# Patient Record
Sex: MALE | Race: WHITE | NOT HISPANIC OR LATINO | Employment: OTHER | URBAN - NONMETROPOLITAN AREA
[De-identification: names, ages, dates, MRNs, and addresses within clinical notes are randomized per-mention and may not be internally consistent; named-entity substitution may affect disease eponyms.]

---

## 2022-09-30 ENCOUNTER — TELEPHONE (OUTPATIENT)
Dept: OTOLARYNGOLOGY | Facility: CLINIC | Age: 77
End: 2022-09-30

## 2022-09-30 NOTE — TELEPHONE ENCOUNTER
Spoke with Medical records to have patient's chart faxed.    HUB please advise if they fax within the next 20 minutes as they are having issues with their fax.

## 2022-10-03 ENCOUNTER — OFFICE VISIT (OUTPATIENT)
Dept: OTOLARYNGOLOGY | Facility: CLINIC | Age: 77
End: 2022-10-03

## 2022-10-03 VITALS
BODY MASS INDEX: 35 KG/M2 | TEMPERATURE: 98 F | SYSTOLIC BLOOD PRESSURE: 125 MMHG | DIASTOLIC BLOOD PRESSURE: 64 MMHG | HEIGHT: 67 IN | WEIGHT: 223 LBS | RESPIRATION RATE: 20 BRPM | HEART RATE: 85 BPM

## 2022-10-03 DIAGNOSIS — Z79.01 ANTICOAGULATED: ICD-10-CM

## 2022-10-03 DIAGNOSIS — J31.0 RHINITIS, CHRONIC: ICD-10-CM

## 2022-10-03 DIAGNOSIS — R04.0 RIGHT-SIDED EPISTAXIS: Primary | ICD-10-CM

## 2022-10-03 DIAGNOSIS — J34.2 ACQUIRED DEVIATED NASAL SEPTUM: ICD-10-CM

## 2022-10-03 PROCEDURE — 99203 OFFICE O/P NEW LOW 30 MIN: CPT | Performed by: OTOLARYNGOLOGY

## 2022-10-03 PROCEDURE — 31238 NSL/SINS NDSC SRG NSL HEMRRG: CPT | Performed by: OTOLARYNGOLOGY

## 2022-10-03 RX ORDER — WARFARIN SODIUM 1 MG/1
TABLET ORAL
COMMUNITY

## 2022-10-03 RX ORDER — DIGOXIN 125 MCG
TABLET ORAL
COMMUNITY

## 2022-10-03 RX ORDER — SENNOSIDES 8.6 MG
CAPSULE ORAL EVERY 12 HOURS SCHEDULED
COMMUNITY

## 2022-10-03 RX ORDER — CEPHALEXIN 500 MG/1
CAPSULE ORAL
COMMUNITY

## 2022-10-03 RX ORDER — ATORVASTATIN CALCIUM 10 MG/1
TABLET, FILM COATED ORAL
COMMUNITY
Start: 2022-08-05

## 2022-10-03 RX ORDER — ASPIRIN 81 MG/1
TABLET ORAL
COMMUNITY

## 2022-10-03 RX ORDER — WARFARIN SODIUM 2.5 MG/1
TABLET ORAL
COMMUNITY

## 2022-10-03 RX ORDER — TRAMADOL HYDROCHLORIDE 50 MG/1
TABLET ORAL
COMMUNITY
Start: 2022-09-30

## 2022-10-03 RX ORDER — OXYMETAZOLINE HYDROCHLORIDE 0.05 G/100ML
2 SPRAY NASAL 3 TIMES DAILY
Qty: 2 ML | Refills: 0 | Status: SHIPPED | OUTPATIENT
Start: 2022-10-03 | End: 2022-10-06

## 2022-10-03 RX ORDER — LISINOPRIL AND HYDROCHLOROTHIAZIDE 25; 20 MG/1; MG/1
TABLET ORAL
COMMUNITY

## 2022-10-03 RX ORDER — MAGNESIUM OXIDE 400 MG/1
TABLET ORAL
COMMUNITY

## 2022-10-03 NOTE — PROGRESS NOTES
Riccardo Alatorre Jr, MD  [facility] [place] [facility] EAR NOSE & THROAT  [address], [address]  [facility] [address]  Fax [phone]  Phone [phone]      Visit Type: NEW PATIENT   Chief Complaint   Patient presents with   • Nose Bleed     Nose bleeds        HPI   Accompanied by: No one  He complains of nosebleed.   He had R sided nosebleed. Began Tues after cleaning scab out. Has Rhinorocket placed Tues. Had removed Thurs for R side. Bled badly. Had Rhinorocket replaced Sat night.  He has been sent here for this.  He has not bled over the weekend.   Smoke- none  Drink- none  Blood thinner- Coumadin. Stopped Wed.  He has artificial heart valve.      History reviewed. No pertinent past medical history.    History reviewed. No pertinent surgical history.    Family History: His family history is not on file.     Social History: He  reports that he has never smoked. He has never used smokeless tobacco. No history on file for alcohol use and drug use.    Home Medications:  Magnesium Chloride, acetaminophen, aspirin, atorvastatin, cephalexin, digoxin, lisinopril-hydrochlorothiazide, magnesium oxide, oxymetazoline, traMADol, and warfarin    Allergies:  He is allergic to clindamycin, dexlansoprazole, oxycodone-acetaminophen, and toprol xl [metoprolol].       Vital Signs:   Temp:  [98 °F (36.7 °C)] 98 °F (36.7 °C)  Heart Rate:  [85] 85  Resp:  [20] 20  BP: (125)/(64) 125/64  ENT Physical Exam  Constitutional  Appearance: patient appears well-developed and well-nourished,  Communication/Voice: communication appropriate for developmental age; vocal quality normal;  Constitutional comments: Obese  Elderly  Head and Face  Appearance: head appears normal, face appears normal and face appears atraumatic;  Palpation: facial palpation normal;  Salivary: glands normal;  Ear  Hearing: intact;  Auricles: bilateral auricles normal;  External Mastoids: right external mastoid normal; left  external mastoid normal;  Ear Canals: bilateral ear canals normal;  Tympanic Membranes: bilateral tympanic membranes normal;  Nose  External Nose: nares patent bilaterally; external nose normal;  Internal Nose: bilateral intranasal mucosa edematous and erythematous; Nasal mucosa comments: R pack removed, mild bleeding   nasal septal deviation present; deviation is to the right and to the left, septal deviation is intermediate; right inferior turbinate edematous; with crusting; left inferior turbinate edematous;  Oral Cavity/Oropharynx  Lips: normal;  Teeth: dentures noted;  Gums: gingiva normal;  Tongue: normal;  Oral mucosa: normal;  Hard palate: normal;  Soft palate: normal;  Tonsils: normal;  Base of Tongue: normal;  Posterior pharyngeal wall: normal;  Neck  Neck: neck normal;  Respiratory  Inspection: breathing unlabored; normal breathing rate;  Cardiovascular  Inspection: extremities are warm and well perfused; no peripheral edema present;  Neurovestibular  Mental Status: alert and oriented;  Psychiatric: mood normal; affect is appropriate;     Nasal endoscopy with left nasal debridement    Date/Time: 10/3/2022 2:59 PM  Performed by: Riccardo Alatorre Jr., MD  Authorized by: Riccardo Alatorre Jr., MD     Consent:     Consent obtained:  Verbal    Consent given by:  Patient    Alternatives discussed:  No treatment  Anesthesia (see MAR for exact dosages):     Anesthesia method:  Topical application    Topical anesthetic:  Tetracaine  Procedure details:     Indications: sino-nasal symptoms      Medication:  Afrin    The nose was examined and debrided using a rigid nasal endoscopy and suction      Debridement location:  Left nasal debridement  Nasal cavity:     right nasal cavity occluded (with short rhinorocket)      Right inferior turbinates: edema and crusting      Left inferior turbinates: edema and enlarged    Septum:    Findings: bleeding and right septal crusting      Deviation: deviated to the right  and deviated to the left      Deviation comment:  R to L mid moderate; L to R low moderate    Prominent septal vessels: right      Severity of septal vessels: intermediate    Sinus/ Nasopharynx:     Right middle meatus: patent and inflammation      Left middle meatus: patent and inflammation      Right nasopharynx: patent and inflammation      Left nasopharynx: patent and inflammation      Right eustachian tube: patent and inflammation      Left eustachian tube: patent and inflammation    Post-procedure details:     Patient tolerance of procedure:  Tolerated well  Comments:      Right Rhino Rocket removed, with some mild oozing from the septum both Kiesselbach's plexus and further posterior cross from the middle turbinate head.  There appears to be excoriation along this area.  The nose was anesthetized, and packed with Afrin tetracaine packs.  Silver nitrate was used to cauterize the right septum.  Cotton with mupirocin was packed above the right inferior turbinate along the site of bleeding on the septum.       Result Review    RESULTS REVIEW    I have reviewed the patients old records in the chart.     Assessment & Plan    Diagnoses and all orders for this visit:    1. Right-sided epistaxis (Primary)  Comments:  Multiple bleeding sites along septum after packing removal  Orders:  -     Nasal endoscopy with left nasal debridement    2. Anticoagulated  Comments:  Coumadin  Orders:  -     Nasal endoscopy with left nasal debridement    3. Acquired deviated nasal septum  Comments:  Left to right low and mid moderate to severe  Orders:  -     Nasal endoscopy with left nasal debridement    4. Rhinitis, chronic  -     Nasal endoscopy with left nasal debridement    Other orders  -     oxymetazoline (AFRIN) 0.05 % nasal spray; 2 sprays into the nostril(s) as directed by provider 3 (Three) Times a Day for 3 days. Use for 3 days then stop  Dispense: 2 mL; Refill: 0       Medical and surgical options were discussed including  observation, continued medical management, medication modification and surgical management. Risks, benefits and alternatives were discussed and questions were answered. After considering the options, the patient decided to proceed with observation and medication modification.     Patient has residual bleeding of the right nasal passage after removal of the pack.  The patient is currently off Coumadin.  I have cauterized the right nasal septum.  I will place a cotton pack for approximately 2 more days.  I have instructed the patient how to manage the pack.  He will continue Afrin 3 times daily x3 days, nasal saline frequently.  If the patient bleeds, he is to call the office.  Afrin x3 days  Nasal saline  Call for any bleeding  Leave nasal packing in position  Hold Coumadin until further notice      My Chart:  Encouraged to enroll in My Chart  Encouraged to review data and findings in My Chart    Patient understand(s) and agree(s) with the treatment plan as described.    Return in about 2 days (around 10/5/2022) for Recheck nose.      Riccardo Alatorre Jr, MD  10/03/22  15:17 CDT

## 2022-10-03 NOTE — PATIENT INSTRUCTIONS
NASAL SALINE:  Use 2 puffs each nostril 4-6 times daily and more frequently if possible.  You can buy saline spray or you can make your own and use an old spray bottle to administer  Use a humidifier at bedside  Recipe for saline:  Water                                 1 quart  Salt (table)                        1 tablespoon  Gylcerin (or Lona Syrup)    1 teaspoon  Sodium bicarbonate           1 teaspoon  Sprays or [place] pots are recommended    Do not allow to stand for more than 24 hrs. Make new solution. There is no preservative in this solution.    Afrin use:  Use 2 puffs each nostril 3 times daily for 3 days only!!  Stop using after 3 days unless instructed to use longer        Leave nasal pack in position    Continue Aspirin    INSTRUCTIONS FOR NOSE BLOWING:  DO NOT BLOW nose week     Do not take Warfarin for now    CONTACT INFORMATION:  The main office phone number is [phone]. For emergencies after hours and on weekends, this number will convert over to our answering service and the on call provider will answer. Please try to keep non emergent phone calls/ questions to office hours 9am-5pm Monday through Friday.     [employer]  As an alternative, you can sign up and use the Epic MyChart system for more direct and quicker access for non emergent questions/ problems.  [facility] [employer] allows you to send messages to your doctor, view your test results, renew your prescriptions, schedule appointments, and more. To sign up, go to [employer] and click on the Sign Up Now link in the New User? box. Enter your [employer] Activation Code exactly as it appears below along with the last four digits of your Social Security Number and your Date of Birth () to complete the sign-up process. If you do not sign up before the expiration date, you must request a new code.    [employer] Activation Code: [email or web address]  Expires: 2022  1:11 PM    If you have questions, you can email  [email or web address] or call [phone] to talk to our MyChart staff. Remember, [email or web address] is NOT to be used for urgent needs. For medical emergencies, dial 911.

## 2022-10-05 ENCOUNTER — OFFICE VISIT (OUTPATIENT)
Dept: OTOLARYNGOLOGY | Facility: CLINIC | Age: 77
End: 2022-10-05

## 2022-10-05 VITALS — HEIGHT: 67 IN | BODY MASS INDEX: 35 KG/M2 | WEIGHT: 223 LBS

## 2022-10-05 DIAGNOSIS — Z79.01 ANTICOAGULATED: ICD-10-CM

## 2022-10-05 DIAGNOSIS — R04.0 RIGHT-SIDED EPISTAXIS: Primary | ICD-10-CM

## 2022-10-05 DIAGNOSIS — J31.0 RHINITIS, CHRONIC: ICD-10-CM

## 2022-10-05 DIAGNOSIS — J34.2 ACQUIRED DEVIATED NASAL SEPTUM: ICD-10-CM

## 2022-10-05 PROCEDURE — 31231 NASAL ENDOSCOPY DX: CPT | Performed by: OTOLARYNGOLOGY

## 2022-10-05 NOTE — PATIENT INSTRUCTIONS
NASAL SALINE:  Use 2 puffs each nostril 4-6 times daily and more frequently if possible.  You can buy saline spray or you can make your own and use an old spray bottle to administer  Use a humidifier at bedside  Recipe for saline:  Water                                 1 quart  Salt (table)                        1 tablespoon  Gylcerin (or Lona Syrup)    1 teaspoon  Sodium bicarbonate           1 teaspoon  Sprays or [place] pots are recommended    Do not allow to stand for more than 24 hrs. Make new solution. There is no preservative in this solution.     Afrin use:  Use 2 puffs each nostril 3 times daily for 3 days only!!  Stop using after 3 days unless instructed to use longer     NOSE BLOWING:  Do not blow nose 1 week  Instead of blowing nose, Suck the secretions back and spit out of mouth. (Kathleen Robledo)   You may blow nose after one week    Restart Coumadin tomorrow 10/6/2022- see family physician for maintenance    [place] for bleeding    CONTACT INFORMATION:  The main office phone number is [phone]. For emergencies after hours and on weekends, this number will convert over to our answering service and the on call provider will answer. Please try to keep non emergent phone calls/ questions to office hours 9am-5pm Monday through Friday.     [employer]  As an alternative, you can sign up and use the Epic MyChart system for more direct and quicker access for non emergent questions/ problems.  [facility] [employer] allows you to send messages to your doctor, view your test results, renew your prescriptions, schedule appointments, and more. To sign up, go to [employer] and click on the Sign Up Now link in the New User? box. Enter your [employer] Activation Code exactly as it appears below along with the last four digits of your Social Security Number and your Date of Birth () to complete the sign-up process. If you do not sign up before the expiration date, you must request a new code.    [employer]  Activation Code: [email or web address]  Expires: 11/2/2022  1:11 PM    If you have questions, you can email [email or web address] or call [phone] to talk to our MyChart staff. Remember, [employer] is NOT to be used for urgent needs. For medical emergencies, dial 911.

## 2022-10-05 NOTE — PROGRESS NOTES
Riccardo Alatorre Jr, MD  [facility] [place] [facility] EAR NOSE & THROAT  [address], [address]  [place] [facility] [address]  Fax [phone]  Phone [phone]      Visit Type: FOLLOW UP   Chief Complaint   Patient presents with   • Follow-up     Remove nasal packing        HPI   Accompanied by: No one  He presents for a follow up evaluation. He has had no Bleeding.     History reviewed. No pertinent past medical history.    History reviewed. No pertinent surgical history.    Family History: His family history is not on file.     Social History: He  reports that he has never smoked. He has never used smokeless tobacco. No history on file for alcohol use and drug use.    Home Medications:  Magnesium Chloride, acetaminophen, aspirin, atorvastatin, cephalexin, digoxin, lisinopril-hydrochlorothiazide, magnesium oxide, oxymetazoline, traMADol, and warfarin    Allergies:  He is allergic to clindamycin, dexlansoprazole, oxycodone-acetaminophen, and toprol xl [metoprolol].       Vital Signs:      ENT Physical Exam  Constitutional  Appearance: patient appears well-developed and well-nourished,  Communication/Voice: communication appropriate for developmental age; vocal quality normal;  Constitutional comments: Obese  Elderly  Head and Face  Appearance: head appears normal, face appears normal and face appears atraumatic;  Palpation: facial palpation normal;  Salivary: glands normal;  Ear  Hearing: intact;  Auricles: bilateral auricles normal;  External Mastoids: right external mastoid normal; left external mastoid normal;  Ear Canals: bilateral ear canals normal;  Tympanic Membranes: bilateral tympanic membranes normal;  Nose  External Nose: nares patent bilaterally; external nose normal;  Internal Nose: bilateral intranasal mucosa edematous and erythematous; crusting noted; Nasal mucosa comments: R pack removed, mild bleeding   nasal septal deviation present; deviation is to the right  and to the left, septal deviation is intermediate; right inferior turbinate edematous; with crusting; left inferior turbinate edematous;  Oral Cavity/Oropharynx  Lips: normal;  Teeth: dentures noted;  Gums: gingiva normal;  Tongue: normal;  Oral mucosa: normal;  Hard palate: normal;  Soft palate: normal;  Tonsils: normal;  Base of Tongue: normal;  Posterior pharyngeal wall: normal;  Neck  Neck: neck normal;  Respiratory  Inspection: breathing unlabored; normal breathing rate;  Cardiovascular  Inspection: extremities are warm and well perfused; no peripheral edema present;  Neurovestibular  Mental Status: alert and oriented;  Psychiatric: mood normal; affect is appropriate;     Nasal Endoscopy    Date/Time: 10/5/2022 10:06 AM  Performed by: Riccardo Alatorre Jr., MD  Authorized by: Riccardo Alatorre Jr., MD     Consent:     Consent obtained:  Verbal    Consent given by:  Patient    Alternatives discussed:  No treatment  Anesthesia (see MAR for exact dosages):     Anesthesia method:  Topical application    Topical anesthetic:  Tetracaine  Procedure details:     Indications: sino-nasal symptoms      Medication:  Afrin    Scope location: bilateral nare    Nasal cavity:     right nasal cavity occluded (Pack removed)      no epistaxis      Right inferior turbinates: edema and crusting      Left inferior turbinates: no edema    Septum:     Findings: right septal crusting      Deviation: deviated to the right      Severity of deviation: severe      Spurs: right    Sinus/ Nasopharynx:     Right middle meatus: patent and inflammation      Left middle meatus: patent and inflammation      inflammation      inflammation      inflammation      inflammation    Post-procedure details:     Patient tolerance of procedure:  Tolerated well  Comments:      Right nasal pack removed, nose scoped with no active bleeding.  Right septal crusting present.  Ointment placed on the left side.       Result Review    RESULTS REVIEW    I  have reviewed the patients old records in the chart.   I have reviewed the patients old records in the chart.     Assessment & Plan    Diagnoses and all orders for this visit:    1. Right-sided epistaxis (Primary)  Comments:  No recurrence of bleeding  Orders:  -     Nasal Endoscopy    2. Anticoagulated  Comments:  Restart in a.m.  Orders:  -     Nasal Endoscopy    3. Acquired deviated nasal septum  Comments:  Moderate to severe left to right  Orders:  -     Nasal Endoscopy    4. Rhinitis, chronic  Comments:  Partially treated       Patient is had no further bleeding since cauterization of the right nasal septum.  I will have him continue nasal ointment and nasal hygiene.  He is to call for bleeding.  He can start his Coumadin again tomorrow.  I will refer him back to his primary care physician for treatment for his artificial valve.  Nasal ointment  Nasal saline  Call for bleeding  Restart Coumadin 10/6/2022     Continue current management plan.      My Chart:  Encouraged to enroll in My Chart  Encouraged to review data and findings in My Chart    Patient understand(s) and agree(s) with the treatment plan as described.    Return in about 6 weeks (around 11/16/2022) for Recheck nose. nasal endoscopy.      Riccardo Alatorre Jr, MD  10/05/22  10:11 CDT

## 2022-11-30 ENCOUNTER — OFFICE VISIT (OUTPATIENT)
Dept: OTOLARYNGOLOGY | Facility: CLINIC | Age: 77
End: 2022-11-30

## 2022-11-30 VITALS
HEART RATE: 84 BPM | SYSTOLIC BLOOD PRESSURE: 155 MMHG | BODY MASS INDEX: 35.38 KG/M2 | OXYGEN SATURATION: 97 % | TEMPERATURE: 97.7 F | RESPIRATION RATE: 16 BRPM | HEIGHT: 67 IN | WEIGHT: 225.4 LBS | DIASTOLIC BLOOD PRESSURE: 86 MMHG

## 2022-11-30 DIAGNOSIS — J31.0 RHINITIS, CHRONIC: ICD-10-CM

## 2022-11-30 DIAGNOSIS — R04.0 RIGHT-SIDED EPISTAXIS: Primary | ICD-10-CM

## 2022-11-30 DIAGNOSIS — Z79.01 ANTICOAGULATED: ICD-10-CM

## 2022-11-30 DIAGNOSIS — J34.2 ACQUIRED DEVIATED NASAL SEPTUM: ICD-10-CM

## 2022-11-30 PROCEDURE — 31231 NASAL ENDOSCOPY DX: CPT | Performed by: OTOLARYNGOLOGY

## 2022-11-30 NOTE — PROGRESS NOTES
Riccardo Alatorre Jr, MD  [facility] EAR NOSE & THROAT  [address], [address]  [place] [address]  Fax [phone]  Phone [phone]      Visit Type: FOLLOW UP   Chief Complaint   Patient presents with   • Right-sided epistaxis        HPI   Accompanied by: No one  He presents for a follow up evaluation. He has had no carisa bleeding. He has had some dark crusting. No soreness now.  Using Noesporin.  NSaline- using.     History reviewed. No pertinent past medical history.    History reviewed. No pertinent surgical history.    Family History: His family history is not on file.     Social History: He  reports that he has never smoked. He has never used smokeless tobacco. No history on file for alcohol use and drug use.    Home Medications:  Magnesium Chloride, acetaminophen, aspirin, atorvastatin, cephalexin, digoxin, lisinopril-hydrochlorothiazide, magnesium oxide, traMADol, and warfarin    Allergies:  He is allergic to clindamycin, dexlansoprazole, oxycodone-acetaminophen, and toprol xl [metoprolol].       Vital Signs:   Temp:  [97.7 °F (36.5 °C)] 97.7 °F (36.5 °C)  Heart Rate:  [84] 84  Resp:  [16] 16  BP: (155)/(86) 155/86  ENT Physical Exam  Constitutional  Appearance: patient appears well-developed and well-nourished,  Communication/Voice: communication appropriate for developmental age; vocal quality normal;  Constitutional comments: Obese  Elderly  Head and Face  Appearance: head appears normal, face appears normal and face appears atraumatic;  Palpation: facial palpation normal;  Salivary: glands normal;  Ear  Hearing: intact;  Auricles: bilateral auricles normal;  External Mastoids: right external mastoid normal; left external mastoid normal;  Ear Canals: bilateral ear canals normal;  Tympanic Membranes: bilateral tympanic membranes normal;  Nose  External Nose: nares patent bilaterally; external nose normal;  Internal Nose: right intranasal  mucosacrusting noted; bilateral intranasal mucosa edematous and erythematous; Nasal mucosa comments: R pack removed, mild bleeding   nasal septal deviation present; deviation is to the right and to the left, septal deviation is intermediate; right inferior turbinate edematous; with crusting; left inferior turbinate edematous;  Oral Cavity/Oropharynx  Lips: normal;  Teeth: dentures noted;  Gums: gingiva normal;  Tongue: normal;  Oral mucosa: normal;  Hard palate: normal;  Soft palate: normal;  Tonsils: normal;  Base of Tongue: normal;  Posterior pharyngeal wall: normal;  Neck  Neck: neck normal;  Respiratory  Inspection: breathing unlabored; normal breathing rate;  Cardiovascular  Inspection: extremities are warm and well perfused; no peripheral edema present;  Neurovestibular  Mental Status: alert and oriented;  Psychiatric: mood normal; affect is appropriate;     Nasal Endoscopy    Date/Time: 11/30/2022 10:01 AM  Performed by: Riccardo Alatorre Jr., MD  Authorized by: Riccardo Alatorre Jr., MD     Consent:     Consent obtained:  Verbal    Consent given by:  Patient    Alternatives discussed:  No treatment  Anesthesia (see MAR for exact dosages):     Anesthesia method:  Topical application  Procedure details:     Indications: sino-nasal symptoms      Medication:  Afrin    Instrument: rigid nasal endoscopy      Scope location: bilateral nare    Nasal cavity:     right nasal cavity occluded (Partially by septum and turb) and left nasal cavity occluded (By very anterior septal deviation at the nasal spine)      no epistaxis and no epistaxis       comment:  Crusting    Right inferior turbinates: edema and enlarged      Left inferior turbinates: edema and enlarged    Septum:     Findings: right septal crusting      Deviation: deviated to the right and deviated to the left      Severity of deviation: severe      Spurs: right and left    Sinus/ Nasopharynx:     Right middle meatus: normal and patent      Left  middle meatus: normal and patent      Right nasopharynx: normal      patent      Left nasopharynx: normal      patent      Right eustachian tube: normal      patent      Left eustachian tube: normal      patent    Post-procedure details:     Patient tolerance of procedure:  Tolerated well  Comments:      Generalized pale edema of the mucosal surfaces  Bilateral Twan area well-healed  Mild crusting and secretions between the inferior turbinate and the right inferior deviated nasal septum       Result Review    RESULTS REVIEW    I have reviewed the patients old records in the chart.   I have reviewed the patients old records in the chart.     Assessment & Plan    Diagnoses and all orders for this visit:    1. Right-sided epistaxis (Primary)  Comments:  No recurrence  Orders:  -     Nasal endoscopy    2. Anticoagulated    3. Acquired deviated nasal septum  Comments:  L to R mid and low mod to severe; R to L very anterior  Orders:  -     Nasal endoscopy    4. Rhinitis, chronic  -     Nasal endoscopy       Medical and surgical options were discussed including observation, continued medical management, medication modification and surgical management. Risks, benefits and alternatives were discussed and questions were answered. After considering the options, the patient decided to proceed with continued medical management.  Continue current management plan.  Patient does not wish surgery at this time.  I feel that if he worsens, septoplasty and turbinoplasty would be helpful.  He is currently anticoagulated.  He is maintaining his nasal hygiene.  He wishes to continue that.  Nasal saline  Ointment to nose  Call for nasal bleeding    My Chart:  Patient is using My Chart    Patient understand(s) and agree(s) with the treatment plan as described.    Return in about 6 months (around 5/30/2023) for Recheck Nose, Nasal endoscopy.      Riccardo Alatorre Jr, MD  11/30/22  10:07 CST

## 2022-11-30 NOTE — PATIENT INSTRUCTIONS
NASAL SALINE:  Use 2 puffs each nostril 4-6 times daily and more frequently if possible.  You can buy saline spray or you can make your own and use an old spray bottle to administer  Use a humidifier at bedside  Recipe for saline:  Water                                 1 quart  Salt (table)                        1 tablespoon  Gylcerin (or Lona Syrup)    1 teaspoon  Sodium bicarbonate           1 teaspoon  Sprays or Aline pots are recommended    Do not allow to stand for more than 24 hrs. Make new solution. There is no preservative in this solution.    Sinus irrigation and saline application can be enhanced with various over-the-counter products.  A WaterPik can be quite useful to irrigate, especially following sinus surgery.  No Avage makes a product that irrigates the nose and some of the sinuses.  NeilMed makes a sign you Gator to irrigate the sinuses.  Neomed also has canned saline that we will come out under pressure.  A [place] pot can also be helpful.  All of these products help keep the nose clear of debris.  Please use as directed on the instructions that come with the particular device.     APPLYING OINTMENT IN THE NOSE:  Use a Qtip or your little finger.  Get a small amount of Ointment (Polysporin, Bactroban, Generic ointment is fine)  Insert into nose gently applying the ointment all around, THEN gently pinch nostrils. This will spread ointement inside nose better.  Do this 2-4 times daily and more often as desired     Call for nasal problems     CONTACT INFORMATION:  The main office phone number is [phone]. For emergencies after hours and on weekends, this number will convert over to our answering service and the on call provider will answer. Please try to keep non emergent phone calls/ questions to office hours 9am-5pm Monday through Friday.     [email or web address]  As an alternative, you can sign up and use the Epic MyChart system for more direct and quicker access for non emergent questions/ problems.   [facility] [employer] allows you to send messages to your doctor, view your test results, renew your prescriptions, schedule appointments, and more. To sign up, go to [employer] and click on the Sign Up Now link in the New User? box. Enter your The App3 Activation Code exactly as it appears below along with the last four digits of your Social Security Number and your Date of Birth () to complete the sign-up process. If you do not sign up before the expiration date, you must request a new code.    The App3 Activation Code: Activation code not generated  Current The App3 Status: Active    If you have questions, you can email [email or web address] or call [phone] to talk to our The App[employer] staff. Remember, The App3 is NOT to be used for urgent needs. For medical emergencies, dial 911.

## 2023-05-18 ENCOUNTER — OFFICE VISIT (OUTPATIENT)
Dept: OTOLARYNGOLOGY | Facility: CLINIC | Age: 78
End: 2023-05-18
Payer: MEDICARE

## 2023-05-18 VITALS
SYSTOLIC BLOOD PRESSURE: 156 MMHG | HEIGHT: 67 IN | WEIGHT: 225 LBS | BODY MASS INDEX: 35.31 KG/M2 | DIASTOLIC BLOOD PRESSURE: 78 MMHG | HEART RATE: 84 BPM | TEMPERATURE: 97.8 F

## 2023-05-18 DIAGNOSIS — J31.0 RHINITIS, CHRONIC: ICD-10-CM

## 2023-05-18 DIAGNOSIS — R04.0 RIGHT-SIDED EPISTAXIS: Primary | ICD-10-CM

## 2023-05-18 DIAGNOSIS — J34.2 ACQUIRED DEVIATED NASAL SEPTUM: ICD-10-CM

## 2023-05-18 DIAGNOSIS — Z79.01 ANTICOAGULATED: ICD-10-CM

## 2023-05-18 RX ORDER — OMEPRAZOLE 40 MG/1
40 CAPSULE, DELAYED RELEASE ORAL
COMMUNITY
Start: 2023-04-17

## 2023-05-18 RX ORDER — SUCRALFATE ORAL 1 G/10ML
SUSPENSION ORAL
COMMUNITY
Start: 2023-02-09

## 2023-05-18 NOTE — PATIENT INSTRUCTIONS
NASAL SALINE:  Use 2 puffs each nostril 4-6 times daily and more frequently if possible.  You can buy saline spray or you can make your own and use an old spray bottle to administer  Use a humidifier at bedside  Recipe for saline:  Water                                 1 quart  Salt (table)                        1 tablespoon  Gylcerin (or Lona Syrup)    1 teaspoon  Sodium bicarbonate           1 teaspoon  Sprays or Aline pots are recommended    Do not allow to stand for more than 24 hrs. Make new solution. There is no preservative in this solution.    Sinus irrigation and saline application can be enhanced with various over-the-counter products.  A WaterPik can be quite useful to irrigate, especially following sinus surgery.  Navage makes a product that irrigates the nose and some of the sinuses.  NeilMed makes a sign you Gator to irrigate the sinuses.  Neomed also has canned saline that we will come out under pressure.  A Aline pot can also be helpful.  All of these products help keep the nose clear of debris.  Please use as directed on the instructions that come with the particular device.     APPLYING OINTMENT IN THE NOSE:  Use a Qtip or your little finger.  Get a small amount of Ointment (Polysporin, Bactroban, Generic ointment is fine)  Insert into nose gently applying the ointment all around, THEN gently pinch nostrils. This will spread ointement inside nose better.  Do this 2-4 times daily and more often as desired        CONTACT INFORMATION:  The main office phone number is [phone]. For emergencies after hours and on weekends, this number will convert over to our answering service and the on call provider will answer. Please try to keep non emergent phone calls/ questions to office hours 9am-5pm Monday through Friday.     [employer]  As an alternative, you can sign up and use the Epic MyChart system for more direct and quicker access for non emergent questions/ problems.  [facility] [employer]iously allows  you to send messages to your doctor, view your test results, renew your prescriptions, schedule appointments, and more. To sign up, go to [email or web address] and click on the Sign Up Now link in the New User? box. Enter your [place] Activation Code exactly as it appears below along with the last four digits of your Social Security Number and your Date of Birth () to complete the sign-up process. If you do not sign up before the expiration date, you must request a new code.    [employer] Activation Code: Activation code not generated  Current [employer] Status: Active    If you have questions, you can email [email or web address] or call [phone] to talk to our [employer] staff. Remember, [employer] is NOT to be used for urgent needs. For medical emergencies, dial 911.

## 2023-05-18 NOTE — PROGRESS NOTES
Riccardo Alatorre Jr, MD  [facility] EAR NOSE & THROAT  [address], [address]  [place] [address]  Fax [phone]  Phone [phone]      Visit Type: FOLLOW UP   Chief Complaint   Patient presents with    6 month recheck nose        HPI  Accompanied by: No one  He presents for a follow up evaluation. He has had no bleeding  Remains on anticoagulation. Using saline and ointment     Past Medical History:   Diagnosis Date    GERD (gastroesophageal reflux disease) 4years ago    Nosebleed 09/18/22       History reviewed. No pertinent surgical history.    Family History: His family history includes Heart failure in his mother.     Social History: He  reports that he has never smoked. He has never used smokeless tobacco. He reports that he does not drink alcohol and does not use drugs.    Home Medications:  Magnesium Chloride, acetaminophen, aspirin, atorvastatin, cephalexin, digoxin, lisinopril-hydrochlorothiazide, magnesium oxide, omeprazole, sucralfate, traMADol, and warfarin    Allergies:  He is allergic to clindamycin, dexlansoprazole, oxycodone-acetaminophen, and toprol xl [metoprolol].       Vital Signs:   Temp:  [97.8 °F (36.6 °C)] 97.8 °F (36.6 °C)  Heart Rate:  [84] 84  BP: (156)/(78) 156/78  ENT Physical Exam  Constitutional  Appearance: patient appears well-developed and well-nourished,  Communication/Voice: communication appropriate for developmental age; vocal quality normal;  Constitutional comments: Obese  Elderly  Head and Face  Appearance: head appears normal, face appears normal and face appears atraumatic;  Palpation: facial palpation normal;  Salivary: glands normal;  Ear  Hearing: intact;  Auricles: bilateral auricles normal;  External Mastoids: right external mastoid normal; left external mastoid normal;  Ear Canals: bilateral ear canals normal;  Tympanic Membranes: bilateral tympanic membranes normal;  Nose  External Nose: nares  patent bilaterally; external nose normal;  Internal Nose: right intranasal mucosacrusting noted; bilateral intranasal mucosa edematous and erythematous; Nasal mucosa comments: R pack removed, mild bleeding   nasal septal deviation present; deviation is to the right and to the left, septal deviation is intermediate; Septum comments: R to L mid moderate; L to R low moderate   right inferior turbinate edematous; with crusting; left inferior turbinate edematous;  Oral Cavity/Oropharynx  Lips: normal;  Teeth: dentures noted;  Gums: gingiva normal;  Tongue: normal;  Oral mucosa: normal;  Hard palate: normal;  Soft palate: normal;  Tonsils: normal;  Base of Tongue: normal;  Posterior pharyngeal wall: normal;  Neck  Neck: neck normal;  Respiratory  Inspection: breathing unlabored; normal breathing rate;  Cardiovascular  Inspection: extremities are warm and well perfused; no peripheral edema present;  Neurovestibular  Mental Status: alert and oriented;  Psychiatric: mood normal; affect is appropriate;   Nasal Endoscopy    Date/Time: 5/18/2023 10:57 AM  Performed by: Riccardo Alatorre Jr., MD  Authorized by: Riccardo Alatorre Jr., MD     Consent:     Consent obtained:  Verbal    Consent given by:  Patient    Alternatives discussed:  No treatment  Anesthesia (see MAR for exact dosages):     Anesthesia method:  Topical application    Topical anesthetic:  Tetracaine  Procedure details:     Indications: sino-nasal symptoms      Medication:  Afrin    Instrument: rigid nasal endoscopy      Scope location: bilateral nare    Nasal cavity:     right nasal cavity not occluded and left nasal cavity not occluded      Right inferior turbinates: edema      Left inferior turbinates: edema    Septum:     Deviation: deviated to the right      Severity of deviation: intermediate    Sinus/ Nasopharynx:     Right middle meatus: normal and patent      Left middle meatus: normal and patent      patent      inflammation      patent       inflammation      patent      inflammation      patent      inflammation    Post-procedure details:     Patient tolerance of procedure:  Tolerated well  Comments:      No evidence of acute bleeding  Deviated nasal septum present left to right, moderate  Middle meatus is clear  Nasopharynx shows mild inflammation but no active bleeding eustachian tubes are intact   Result Review    RESULTS REVIEW    I have reviewed the patients old records in the chart.   I have reviewed the patients old records in the chart.     Assessment & Plan    Diagnoses and all orders for this visit:    1. Right-sided epistaxis (Primary)  Comments:  No recurrence  Orders:  -     Nasal endoscopy    2. Anticoagulated    3. Acquired deviated nasal septum  Comments:  No change  Orders:  -     Nasal endoscopy    4. Rhinitis, chronic  Comments:  Partially controlled  Orders:  -     Nasal endoscopy       Continue current management plan.  Patient appears to have no further bleeding from the nose.  I will continue nasal saline.  He is currently anticoagulated.  I will have him call for any further nasal bleeding.    My Chart:  Patient is using My Chart    Patient understand(s) and agree(s) with the treatment plan as described.    Return if symptoms worsen or fail to improve, for Recheck nose.      Riccardo Alatorre Jr, MD   05/18/23  11:00 CDT

## 2024-02-05 ENCOUNTER — TELEPHONE (OUTPATIENT)
Dept: OTOLARYNGOLOGY | Facility: CLINIC | Age: 79
End: 2024-02-05
Payer: MEDICARE

## 2024-02-05 NOTE — TELEPHONE ENCOUNTER
I spoke to patient, he has biopsy 2 weeks ago by Dr Ham, he stated Dr Ham is referring him back to us for additional care he does not do this surgery anymore. He states it was cancer, it has been draining, is painful and very swollen. He states he has requested medical records be faxed to our office. Scheduled pt on 2-7-24 at 1:00

## 2024-02-07 ENCOUNTER — OFFICE VISIT (OUTPATIENT)
Dept: OTOLARYNGOLOGY | Facility: CLINIC | Age: 79
End: 2024-02-07
Payer: MEDICARE

## 2024-02-07 VITALS
HEIGHT: 67 IN | SYSTOLIC BLOOD PRESSURE: 152 MMHG | DIASTOLIC BLOOD PRESSURE: 83 MMHG | TEMPERATURE: 98.4 F | HEART RATE: 80 BPM | BODY MASS INDEX: 31.71 KG/M2 | WEIGHT: 202 LBS

## 2024-02-07 DIAGNOSIS — J31.0 RHINITIS, CHRONIC: ICD-10-CM

## 2024-02-07 DIAGNOSIS — Z79.01 ANTICOAGULATED: ICD-10-CM

## 2024-02-07 DIAGNOSIS — R04.0 RIGHT-SIDED EPISTAXIS: Primary | ICD-10-CM

## 2024-02-07 DIAGNOSIS — J34.2 ACQUIRED DEVIATED NASAL SEPTUM: ICD-10-CM

## 2024-02-07 DIAGNOSIS — C44.229 SQUAMOUS CELL CANCER OF SKIN OF TRAGUS OF LEFT EAR: ICD-10-CM

## 2024-02-07 NOTE — PATIENT INSTRUCTIONS
Wash wound with soap and water    CT scan ordered    PET scan ordered    Call for problems      CONTACT INFORMATION:  The main office phone number is [phone]. For emergencies after hours and on weekends, this number will convert over to our answering service and the on call provider will answer. Please try to keep non emergent phone calls/ questions to office hours 9am-5pm Monday through Friday.     [employer]als  As an alternative, you can sign up and use the Epic MyChart system for more direct and quicker access for non emergent questions/ problems.  [facility] [employer]ignals allows you to send messages to your doctor, view your test results, renew your prescriptions, schedule appointments, and more. To sign up, go to [employer] and click on the Sign Up Now link in the New User? box. Enter your Zignals Activation Code exactly as it appears below along with the last four digits of your Social Security Number and your Date of Birth () to complete the sign-up process. If you do not sign up before the expiration date, you must request a new code.    Zignals Activation Code: Activation code not generated  Current Zignals Status: Active    If you have questions, you can email [email or web address] or call [phone] to talk to our Zignals staff. Remember, Zignals is NOT to be used for urgent needs. For medical emergencies, dial 911.

## 2024-02-07 NOTE — PROGRESS NOTES
Riccardo Alatorre Jr, MD  [facility]SE & THROAT  [address], [address]  [place] [address]  Fax [phone]  Phone [phone]      Visit Type: FOLLOW UP   Chief Complaint   Patient presents with    Left ear     Biopsy was 2 weeks ago, he is pain and it is draining           HPI  Accompanied by:No one  He presents for a follow up evaluation.  Patient returns for evaluation of lesion of the tragus.  He says it has been there at least 6 months.  He was in a nursing home situation because of other issues.  The patient did not have this checked until recently.  He was told he had an abscess of the left face.  Ultimately, the patient underwent biopsy which showed squamous cell carcinoma keratinizing.  He is complaining of pain and drainage in this area.  He returns to see me for evaluation and recommendation of therapy.  The patient was seen previously by an ENT doctor.  The patient says he was referred back to me.    Past Medical History:   Diagnosis Date    GERD (gastroesophageal reflux disease) 4years ago    Nosebleed 09/18/22       History reviewed. No pertinent surgical history.    Family History: His family history includes Heart failure in his mother.     Social History: He  reports that he has never smoked. He has never used smokeless tobacco. He reports that he does not drink alcohol and does not use drugs.    Home Medications:  Magnesium Chloride, acetaminophen, aspirin, atorvastatin, cephalexin, digoxin, lisinopril-hydrochlorothiazide, magnesium oxide, omeprazole, sucralfate, traMADol, and warfarin    Allergies:  He is allergic to clindamycin, dexlansoprazole, oxycodone-acetaminophen, and toprol xl [metoprolol].       Vital Signs:      ENT Physical Exam  Constitutional  Appearance: patient appears well-developed and well-nourished,  Communication/Voice: communication appropriate for developmental age; vocal quality normal;  Constitutional  comments: Obese  Elderly  Head and Face  Appearance: head appears normal, face appears normal and face appears atraumatic;  Palpation: facial palpation normal;  Salivary: glands normal;  Ear  Hearing: intact;  Auricles: right auricle normal; left auricle inflamed; with skin lesions (Tragus 2 cm tumor); Auricles comments: L open woundat tragus with necrotic tissue; 2.5 cm mass present  External Mastoids: right external mastoid normal; left external mastoid normal;  Ear Canals: bilateral ear canals normal;  Tympanic Membranes: bilateral tympanic membranes normal;  Nose  External Nose: nares patent bilaterally; external nose normal;  Internal Nose: right intranasal mucosacrusting noted; bilateral intranasal mucosa edematous and erythematous; Nasal mucosa comments: R pack removed, mild bleeding   nasal septal deviation present; deviation is to the right and to the left, septal deviation is intermediate; Septum comments: R to L mid moderate; L to R low moderate   right inferior turbinate edematous; with crusting; left inferior turbinate edematous;  Oral Cavity/Oropharynx  Lips: normal;  Teeth: dentures noted;  Gums: gingiva normal;  Tongue: normal;  Oral mucosa: normal;  Hard palate: normal;  Soft palate: normal;  Tonsils: normal;  Base of Tongue: normal;  Posterior pharyngeal wall: normal;  Neck  Neck: neck normal;  Respiratory  Inspection: breathing unlabored; normal breathing rate;  Cardiovascular  Inspection: extremities are warm and well perfused; no peripheral edema present;  Neurovestibular  Mental Status: alert and oriented;  Psychiatric: mood normal; affect is appropriate;           Result Review       RESULTS REVIEW    I have reviewed the patients old records in the chart.   I have reviewed the patients old records in the chart.  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 Right-sided epistaxis      Resolved      2. 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 Anticoagulated        4. Acquired deviated nasal septum        5. Rhinitis, chronic      Partially treated          Orders Placed This Encounter   Procedures    CT Temporal Bones With & Without Contrast    NM PET/CT Skull Base to Mid Thigh         Medical and surgical options were discussed including observation, continued medical management, medication modification, surgical management, and PET scan. Risks, benefits and alternatives were discussed and questions were answered. After considering the options, the patient decided to proceed with CT scanning and PET scan.  Patient appears to have squamous cell carcinoma of the right tragus.  This appears to be extensive in nature.  I am concerned that he has violated the capsule of the temporomandibular joint and perhaps the anterior external auditory canal cartilages.  He requires further CT scanning for staging and PET scanning for staging as well.  I feel this is indicated.  The patient may require extensive surgery including TMJ resection and lateral temporal bone resection for surgical approach.  I will make further recommendations based on his imaging.  In the meantime, the patient will just continue wound care.  Keep wound clean with soap and water and possible ointment  CT scan ordered  PET scan ordered    My Chart:  Patient is using My Chart    Patient understand(s) and agree(s) with the treatment plan as described.    Return RTC after Imaging, for Recheck L ear.        Electronically signed by Riccardo Alatorre Jr, MD 02/07/24 1:21 PM CST.

## 2024-02-12 ENCOUNTER — HOSPITAL ENCOUNTER (OUTPATIENT)
Dept: CT IMAGING | Facility: HOSPITAL | Age: 79
Discharge: HOME | End: 2024-02-12
Admitting: OTOLARYNGOLOGY
Payer: MEDICARE

## 2024-02-12 LAB — CREAT BLDA-MCNC: 0.9 MG/DL (ref 0.6–1.3)

## 2024-02-12 PROCEDURE — 25510000001 IOPAMIDOL 61 % SOLUTION: Performed by: OTOLARYNGOLOGY

## 2024-02-12 PROCEDURE — 82565 ASSAY OF CREATININE: CPT

## 2024-02-12 PROCEDURE — 70482 CT ORBIT/EAR/FOSSA W/O&W/DYE: CPT

## 2024-02-12 RX ADMIN — IOPAMIDOL 100 ML: 612 INJECTION, SOLUTION INTRAVENOUS at 10:54

## 2024-02-13 ENCOUNTER — TELEPHONE (OUTPATIENT)
Dept: OTOLARYNGOLOGY | Facility: CLINIC | Age: 79
End: 2024-02-13
Payer: MEDICARE

## 2024-02-15 ENCOUNTER — HOSPITAL ENCOUNTER (OUTPATIENT)
Dept: CT IMAGING | Facility: HOSPITAL | Age: 79
Discharge: HOME | End: 2024-02-15
Payer: MEDICARE

## 2024-02-15 PROCEDURE — 78815 PET IMAGE W/CT SKULL-THIGH: CPT

## 2024-02-15 PROCEDURE — 0 FLUDEOXYGLUCOSE F18 SOLUTION: Performed by: OTOLARYNGOLOGY

## 2024-02-15 PROCEDURE — A9552 F18 FDG: HCPCS | Performed by: OTOLARYNGOLOGY

## 2024-02-15 RX ADMIN — FLUDEOXYGLUCOSE F 18 1 DOSE: 200 INJECTION, SOLUTION INTRAVENOUS at 09:28

## 2024-02-22 ENCOUNTER — TELEPHONE (OUTPATIENT)
Dept: OTOLARYNGOLOGY | Facility: CLINIC | Age: 79
End: 2024-02-22
Payer: MEDICARE

## 2024-02-22 NOTE — TELEPHONE ENCOUNTER
----- Message from Riccardo Alatorre Jr., MD sent at 2/22/2024 11:13 AM CST -----  Regarding: PET scan results  Please call patient tell him I reviewed PET scan results.  I will be happy to discuss treatment options in the office.  However, if the patient wishes surgery for this, this is an extensive surgery and [facility] this tumor.  ----- Message -----  From: Interface, Rad Results [place] In  Sent: 2/15/2024  11:47 AM CST  To: Riccardo Alatorre Jr., MD

## 2024-02-22 NOTE — TELEPHONE ENCOUNTER
Left christiano for his son to see if he had his octaviano at [place] yet. Requested call back.

## 2024-03-19 ENCOUNTER — TELEPHONE (OUTPATIENT)
Dept: OTOLARYNGOLOGY | Facility: CLINIC | Age: 79
End: 2024-03-19
Payer: MEDICARE

## 2024-03-19 NOTE — TELEPHONE ENCOUNTER
----- Message from Dorothy Abarca RN sent at 3/11/2024  5:02 PM CDT -----  Regarding: FW: PET scan results    ----- Message -----  From: Dorothy Abarca RN  Sent: 3/11/2024   5:02 PM CDT  To: Dorothy Abarca RN  Subject: FW: PET scan results                               ----- Message -----  From: Dorothy Abarca RN  Sent: 3/11/2024   5:01 PM CDT  To: Shakira Ag LPN  Subject: FW: PET scan results                               ----- Message -----  From: Riccardo Alatorre Jr., MD  Sent: 2/22/2024  11:14 AM CDT  To: Shakira Ag LPN; Luci Miner MA  Subject: PET scan results                                 Please call patient tell him I reviewed PET scan results.  I will be happy to discuss treatment options in the office.  However, if the patient wishes surgery for this, this is an extensive surgery and I will refer him to a [place] setting for resection of this tumor.  ----- Message -----  From: Interface, Rad Results [place] In  Sent: 2/15/2024  11:47 AM CST  To: Riccardo Alatorre Jr., MD

## 2024-03-19 NOTE — TELEPHONE ENCOUNTER
I spoke with patient to verify he had his appt with [facility]. He stated he is having surgery tomorrow. He should contact our office if he needs additional care.

## 2025-02-27 ENCOUNTER — OFFICE VISIT (OUTPATIENT)
Dept: CARDIOLOGY | Facility: CLINIC | Age: 80
End: 2025-02-27
Payer: MEDICARE

## 2025-02-27 ENCOUNTER — TELEPHONE (OUTPATIENT)
Dept: CARDIOLOGY | Facility: CLINIC | Age: 80
End: 2025-02-27

## 2025-02-27 VITALS
BODY MASS INDEX: 28.42 KG/M2 | OXYGEN SATURATION: 96 % | HEART RATE: 69 BPM | SYSTOLIC BLOOD PRESSURE: 97 MMHG | HEIGHT: 71 IN | WEIGHT: 203 LBS | DIASTOLIC BLOOD PRESSURE: 73 MMHG

## 2025-02-27 DIAGNOSIS — I48.19 ATRIAL FIBRILLATION, PERSISTENT: Primary | ICD-10-CM

## 2025-02-27 DIAGNOSIS — Z79.01 CHRONIC ANTICOAGULATION: ICD-10-CM

## 2025-02-27 DIAGNOSIS — I50.22 CHRONIC SYSTOLIC (CONGESTIVE) HEART FAILURE: ICD-10-CM

## 2025-02-27 DIAGNOSIS — Z95.2 H/O MITRAL VALVE REPLACEMENT WITH MECHANICAL VALVE: ICD-10-CM

## 2025-02-27 PROBLEM — K21.9 GERD (GASTROESOPHAGEAL REFLUX DISEASE): Status: ACTIVE | Noted: 2025-02-27

## 2025-02-27 RX ORDER — SACUBITRIL AND VALSARTAN 24; 26 MG/1; MG/1
1 TABLET, FILM COATED ORAL 2 TIMES DAILY
Qty: 60 TABLET | Refills: 11 | Status: SHIPPED | OUTPATIENT
Start: 2025-02-27

## 2025-02-27 RX ORDER — SACUBITRIL AND VALSARTAN 49; 51 MG/1; MG/1
1 TABLET, FILM COATED ORAL 2 TIMES DAILY
COMMUNITY
End: 2025-02-27

## 2025-02-27 RX ORDER — SPIRONOLACTONE 25 MG/1
25 TABLET ORAL DAILY
COMMUNITY

## 2025-02-27 NOTE — LETTER
February 27, 2025     MOUSTAPHA Castro  [address] [facility] [address]    Patient: Ravi Church   YOB: 1945   Date of Visit: 2/27/2025       Dear MOUSTAPHA Castro,    Thank you for referring Ravi Church to me for evaluation. Below is a copy of my consult note.    If you have questions, please do not hesitate to call me. I look forward to following Ravi along with you.         Sincerely,        Kb Escobar MD        CC: No Recipients      Reason for Visit: Atrial fibrillation.    HPI:  Ravi Church is a 80 y.o. male is here today as a self referral.  He previously followed with Dr. Gurrola and is transitioning care.  He previously had a mechanical mitral valve replacement in 2001 due to mitral valve prolapse.  He followed with Dr Pierre in [place] out of [facility] prior to Izabela.  He reports being active and doing well for the most part.  He only gets mild shortness of breath with activity.  He will only occasionally have to stop and rest.  He works on a small farm.  He gets mild palpitations that do not particularly bother him.  His blood pressure tends to run normal at home.  He only takes Entresto once a day because otherwise his blood pressure will get too low.  He notes his prior cardiologist discussed adding Jardiance, but was not willing to take due to previous issues with knee infections.  His INR/warfarin levels are managed by his primary care provider.  He was previously told that his atrial fibrillation converted back to normal sinus rhythm when he sleeps.      Previous Cardiac Testing and Procedures:  -Mechanical MV replacement (9/25/2001) St Nikko 31 mm at [facility] in [place], [place]  -Echo (12/30/2024) EF 35-40%, mechanical mitral valve with mean gradient 4 mmHg, normal RV size and function, moderate to severe biatrial enlargement, RVSP 51 mmHg  -Holter monitor (1/16/2025) primary rhythm was atrial fibrillation with average heart rate 68 bpm, frequent PVCs  with a burden of 5.5%, 6 runs of nonsustained VT up to 120 beats    Lab data:  -Lipid panel (5/9/2024) total cholesterol 97, HDL 36, LDL 47, triglycerides 72  -BMP (5/15/2024) creatinine 0.91, potassium 4.2, sodium 138    Patient Active Problem List   Diagnosis   • Atrial fibrillation, persistent   • Chronic systolic (congestive) heart failure   • H/O mitral valve replacement with mechanical valve   • Chronic anticoagulation   • GERD (gastroesophageal reflux disease)       Social History     Tobacco Use   • Smoking status: Never   • Smokeless tobacco: Never   Vaping Use   • Vaping status: Never Used   Substance Use Topics   • Alcohol use: Never   • Drug use: Never       Family History   Problem Relation Age of Onset   • Heart failure Mother        The following portions of the patient's history were reviewed and updated as appropriate: allergies, current medications, past family history, past medical history, past social history, past surgical history, and problem list.      Current Outpatient Medications:   •  acetaminophen (TYLENOL) 650 MG 8 hr tablet, Every 12 (Twelve) Hours., Disp: , Rfl:   •  aspirin 81 MG EC tablet, aspirin 81 mg tablet,delayed release  Take 1 tablet every day by oral route., Disp: , Rfl:   •  atorvastatin (LIPITOR) 10 MG tablet, TAKE 1 TABLET BY MOUTH EVERY DAY IN THE EVENING FOR 90 DAYS, Disp: , Rfl:   •  Magnesium Chloride (MAG64 PO), Mag 64, Disp: , Rfl:   •  magnesium oxide (MAG-OX) 400 MG tablet, magnesium oxide 400 mg (241.3 mg magnesium) tablet  Take 1 tablet every day by oral route in the morning., Disp: , Rfl:   •  omeprazole (priLOSEC) 40 MG capsule, Take 1 capsule by mouth 2 (Two) Times a Day Before Meals., Disp: , Rfl:   •  spironolactone (ALDACTONE) 25 MG tablet, Take 1 tablet by mouth Daily., Disp: , Rfl:   •  warfarin (COUMADIN) 1 MG tablet, warfarin 1 mg tablet  TAKE 1 TABLET(1 MG) BY MOUTH EVERY DAY, Disp: , Rfl:   •  warfarin (COUMADIN) 2.5 MG tablet, warfarin 2.5 mg tablet,  "Disp: , Rfl:   •  sacubitril-valsartan (Entresto) 24-26 MG tablet, Take 1 tablet by mouth 2 (Two) Times a Day., Disp: 60 tablet, Rfl: 11    Review of Systems   Constitutional: Negative for chills and fever.   Cardiovascular:  Positive for dyspnea on exertion. Negative for chest pain and paroxysmal nocturnal dyspnea.   Respiratory:  Positive for shortness of breath. Negative for cough.    Skin:  Negative for rash.   Gastrointestinal:  Negative for abdominal pain and heartburn.   Neurological:  Negative for dizziness and numbness.       Objective  BP 97/73 (BP Location: Left arm, Patient Position: Sitting, Cuff Size: Adult)   Pulse 69   Ht 180.3 cm (71\")   Wt 92.1 kg (203 lb)   SpO2 96%   BMI 28.31 kg/m²   Constitutional:       Appearance: Well-developed.   HENT:      Head: Normocephalic and atraumatic.   Pulmonary:      Effort: Pulmonary effort is normal.      Breath sounds: Normal breath sounds.   Cardiovascular:      Normal rate. Irregularly irregular rhythm.      Murmurs: There is a grade 2/6 holosystolic murmur.       click. Mechanical MV clicking   Edema:     Peripheral edema absent.   Skin:     General: Skin is warm and dry.   Neurological:      Mental Status: Alert and oriented to person, place, and time.         ECG 12 Lead    Date/Time: 2/27/2025 10:21 AM  Performed by: Kb Escobar MD    Authorized by: Kb Escobar MD  Comparison: compared with previous ECG from 1/16/2025  Similar to previous ECG  Rhythm: atrial fibrillation  Ectopy: unifocal PVCs  Conduction: right bundle branch block            ICD-10-CM ICD-9-CM   1. Atrial fibrillation, persistent  I48.19 427.31   2. Chronic systolic (congestive) heart failure  I50.22 428.22     428.0   3. H/O mitral valve replacement with mechanical valve  Z95.2 V43.3   4. Chronic anticoagulation  Z79.01 V58.61         Assessment/Plan:  1.  Persistent atrial fibrillation: Patient appears to be in atrial fibrillation for the majority if not the entire " Holter monitor based on report at [facility] on 1/16/2025.  Heart rate is controlled without any AV woodrow blocking drugs.  Continue anticoagulation with warfarin.    2.  Chronic systolic CHF: EF 35-40% on echo on 12/30/2024.  He appears euvolemic and stable on exam today.  NYHA class I functional capacity.  Not willing to take SGLT2 inhibitor due to infection risk.  No beta-blocker at this time due to hypotension and low normal resting heart rate.  Decrease Entresto to 24/26 mg as he is currently only taking the medium dose of Entresto once a day due to hypotension.  Continue spironolactone.    3.  Mechanical mitral valve replacement: 31 mm Saint Nikko valve on 9/25/2001.  Echo on 12/30/2024 showed normal function with mean gradient of 4 mmHg.  Continue warfarin.    4.  Chronic anticoagulation: Continue warfarin.

## 2025-03-06 ENCOUNTER — TELEPHONE (OUTPATIENT)
Dept: CARDIOLOGY | Facility: CLINIC | Age: 80
End: 2025-03-06
Payer: MEDICARE

## 2025-03-06 RX ORDER — SACUBITRIL AND VALSARTAN 24; 26 MG/1; MG/1
1 TABLET, FILM COATED ORAL 2 TIMES DAILY
Qty: 60 TABLET | Refills: 11 | Status: SHIPPED | OUTPATIENT
Start: 2025-03-06

## 2025-03-06 NOTE — TELEPHONE ENCOUNTER
Caller: Ravi Church    Relationship: Self    Best call back number: [phone]     What is the best time to reach you: ANYTIME    Who are you requesting to speak with (clinical staff, provider,  specific staff member): MACARIO    What was the call regarding: PATIENT RECEIVED A MESSAGE ON [employer] THAT HE NEEDED TO CALL MACARIO.

## 2025-03-12 DIAGNOSIS — I50.22 CHRONIC SYSTOLIC (CONGESTIVE) HEART FAILURE: Primary | ICD-10-CM

## 2025-03-12 RX ORDER — FUROSEMIDE 40 MG/1
40 TABLET ORAL DAILY
Qty: 30 TABLET | Refills: 11 | Status: SHIPPED | OUTPATIENT
Start: 2025-03-12 | End: 2025-03-12 | Stop reason: SDUPTHER

## 2025-03-12 RX ORDER — FUROSEMIDE 40 MG/1
40 TABLET ORAL DAILY
Qty: 30 TABLET | Refills: 11 | Status: SHIPPED | OUTPATIENT
Start: 2025-03-12

## 2025-04-07 ENCOUNTER — TELEPHONE (OUTPATIENT)
Dept: CARDIOLOGY | Facility: CLINIC | Age: 80
End: 2025-04-07

## 2025-04-07 RX ORDER — SPIRONOLACTONE 25 MG/1
25 TABLET ORAL DAILY
Qty: 90 TABLET | Refills: 3 | Status: SHIPPED | OUTPATIENT
Start: 2025-04-07

## 2025-04-07 RX ORDER — SPIRONOLACTONE 25 MG/1
25 TABLET ORAL DAILY
Qty: 90 TABLET | Refills: 3 | Status: CANCELLED | OUTPATIENT
Start: 2025-04-07

## 2025-04-07 NOTE — TELEPHONE ENCOUNTER
Caller: Ravi Church    Relationship: Self    Best call back number: [phone]    Requested Prescriptions:   Requested Prescriptions     Pending Prescriptions Disp Refills    spironolactone (ALDACTONE) 25 MG tablet       Sig: Take 1 tablet by mouth Daily.        Pharmacy where request should be sent:      Last office visit with prescribing clinician: 2/27/2025   Last telemedicine visit with prescribing clinician: Visit date not found   Next office visit with prescribing clinician: 5/29/2025     Additional details provided by patient: WOULD LIKE 90-DAYS SUPPLY ON THIS MEDICATION.    Does the patient have less than a 3 day supply:  [x] Yes  [] No    Would you like a call back once the refill request has been completed: [x] Yes [] No    If the office needs to give you a call back, can they leave a voicemail: [x] Yes [] No    Alfred Graves Rep   04/07/25 10:24 CDT

## 2025-05-29 ENCOUNTER — OFFICE VISIT (OUTPATIENT)
Dept: CARDIOLOGY | Facility: CLINIC | Age: 80
End: 2025-05-29
Payer: MEDICARE

## 2025-05-29 VITALS
HEIGHT: 71 IN | HEART RATE: 68 BPM | BODY MASS INDEX: 27.72 KG/M2 | SYSTOLIC BLOOD PRESSURE: 112 MMHG | WEIGHT: 198 LBS | DIASTOLIC BLOOD PRESSURE: 64 MMHG | OXYGEN SATURATION: 93 %

## 2025-05-29 DIAGNOSIS — Z79.01 CHRONIC ANTICOAGULATION: ICD-10-CM

## 2025-05-29 DIAGNOSIS — I48.19 ATRIAL FIBRILLATION, PERSISTENT: Primary | ICD-10-CM

## 2025-05-29 DIAGNOSIS — I50.22 CHRONIC SYSTOLIC (CONGESTIVE) HEART FAILURE: ICD-10-CM

## 2025-05-29 DIAGNOSIS — Z95.2 H/O MITRAL VALVE REPLACEMENT WITH MECHANICAL VALVE: ICD-10-CM

## 2025-05-29 NOTE — PROGRESS NOTES
Reason for Visit: cardiovascular follow up for atrial fibrillation, systolic CHF, mitral valve replacement, and management of chronic anticoagulation.    HPI:  Ravi Church is a 80 y.o. male is here today for 3-month follow-up.  He was seen as a self-referral for atrial fibrillation on 2/27/2025.  Significant cardiac history includes persistent atrial fibrillation, chronic systolic CHF, mechanical arctic valve replacement, and chronic anticoagulation.  He is planning on traveling more in the near future.  He wants to take the Lasix on as needed basis.  He has an upset stomach and wonders if the spironolactone could be contributing.  He tries to eat healthy and get a lot of protein.  He feels tired and fatigued at times.      Previous Cardiac Testing and Procedures:  -Mechanical MV replacement (9/25/2001) St Nikko 31 mm at [facility] in [place], [place]  -Echo (12/30/2024) EF 35-40%, mechanical mitral valve with mean gradient 4 mmHg, normal RV size and function, moderate to severe biatrial enlargement, RVSP 51 mmHg  -Holter monitor (1/16/2025) primary rhythm was atrial fibrillation with average heart rate 68 bpm, frequent PVCs with a burden of 5.5%, 6 runs of nonsustained VT up to 120 beats     Lab data:  -Lipid panel (5/9/2024) total cholesterol 97, HDL 36, LDL 47, triglycerides 72  -BMP (5/15/2024) creatinine 0.91, potassium 4.2, sodium 138  -BMP (4/16/2025) creatinine 1.1, GFR > 60, potassium 4.4, sodium 142  -Lipid panel (5/21/2025) total cholesterol 86, HDL 37, LDL 35, triglycerides 69    Patient Active Problem List   Diagnosis    Atrial fibrillation, persistent    Chronic systolic (congestive) heart failure    H/O mitral valve replacement with mechanical valve    Chronic anticoagulation    GERD (gastroesophageal reflux disease)       Social History     Tobacco Use    Smoking status: Never    Smokeless tobacco: Never   Vaping Use    Vaping status: Never Used   Substance Use Topics    Alcohol use: Never     Drug use: Never       Family History   Problem Relation Age of Onset    Heart failure Mother        The following portions of the patient's history were reviewed and updated as appropriate: allergies, current medications, past family history, past medical history, past social history, past surgical history, and problem list.      Current Outpatient Medications:     acetaminophen (TYLENOL) 650 MG 8 hr tablet, Every 12 (Twelve) Hours., Disp: , Rfl:     aspirin 81 MG EC tablet, aspirin 81 mg tablet,delayed release  Take 1 tablet every day by oral route., Disp: , Rfl:     atorvastatin (LIPITOR) 10 MG tablet, TAKE 1 TABLET BY MOUTH EVERY DAY IN THE EVENING FOR 90 DAYS, Disp: , Rfl:     furosemide (LASIX) 40 MG tablet, Take 1 tablet by mouth Daily., Disp: 30 tablet, Rfl: 11    Magnesium Chloride (MAG64 PO), Mag 64, Disp: , Rfl:     magnesium oxide (MAG-OX) 400 MG tablet, magnesium oxide 400 mg (241.3 mg magnesium) tablet  Take 1 tablet every day by oral route in the morning., Disp: , Rfl:     omeprazole (priLOSEC) 40 MG capsule, Take 1 capsule by mouth 2 (Two) Times a Day Before Meals., Disp: , Rfl:     sacubitril-valsartan (Entresto) 24-26 MG tablet, Take 1 tablet by mouth 2 (Two) Times a Day., Disp: 60 tablet, Rfl: 11    spironolactone (ALDACTONE) 25 MG tablet, Take 1 tablet by mouth Daily., Disp: 90 tablet, Rfl: 3    warfarin (COUMADIN) 1 MG tablet, warfarin 1 mg tablet  TAKE 1 TABLET(1 MG) BY MOUTH EVERY DAY, Disp: , Rfl:     warfarin (COUMADIN) 2.5 MG tablet, warfarin 2.5 mg tablet, Disp: , Rfl:     Review of Systems   Constitutional: Positive for malaise/fatigue. Negative for chills and fever.   Cardiovascular:  Negative for chest pain and paroxysmal nocturnal dyspnea.   Respiratory:  Negative for cough and shortness of breath.    Skin:  Negative for rash.   Gastrointestinal:  Positive for nausea. Negative for abdominal pain and heartburn.   Neurological:  Negative for dizziness and numbness.       Objective   BP  "112/64 (BP Location: Left arm, Patient Position: Sitting, Cuff Size: Adult)   Pulse 68   Ht 180.3 cm (70.98\")   Wt 89.8 kg (198 lb)   SpO2 93%   BMI 27.63 kg/m²   Constitutional:       Appearance: Well-developed.   HENT:      Head: Normocephalic and atraumatic.   Pulmonary:      Effort: Pulmonary effort is normal.      Breath sounds: Normal breath sounds.   Cardiovascular:      Normal rate. Irregular rhythm.      Comments: Mechanical MV click  Edema:     Peripheral edema present.     Pretibial: bilateral trace edema of the pretibial area.     Ankle: bilateral trace edema of the ankle.  Skin:     General: Skin is warm and dry.   Neurological:      Mental Status: Alert and oriented to person, place, and time.       Procedures      ICD-10-CM ICD-9-CM   1. Atrial fibrillation, persistent  I48.19 427.31   2. Chronic systolic (congestive) heart failure  I50.22 428.22     428.0   3. H/O mitral valve replacement with mechanical valve  Z95.2 V43.3   4. Chronic anticoagulation  Z79.01 V58.61         Assessment/Plan:  1.  Persistent atrial fibrillation: Rate controlled atrial fibrillation on EKG from last visit in February.  Heart rate remains normal today and has not required any AV woodrow blocking drugs.  No significant palpitations.  Continue warfarin.       2.  Chronic systolic CHF: EF 35-40% on echo on 12/30/2024.  Remains euvolemic on exam with NYHA class I functional capacity.  Previously declined SGLT2 inhibitor due to infection risk.  Blood pressure is on the low side and there is no room to add a beta-blocker a beta-blocker may also make him bradycardic.  Continue Entresto and spironolactone.  We discussed taking furosemide on a as needed basis.      3.  Mechanical mitral valve replacement: 31 mm Saint Nikko valve on 9/25/2001.  Normal function with mean gradient 4 mmHg on echo on 12/30/2024.  Continue warfarin.     4.  Chronic anticoagulation: Continue warfarin.  "

## 2025-05-29 NOTE — LETTER
May 29, 2025     MOUSTAPHA Castro  [address] [facility] [address]    Patient: Ravi Church   YOB: 1945   Date of Visit: 5/29/2025       Dear MOUSTAPHA Castro    Ravi Church was in my office today. Below is a copy of my note.    If you have questions, please do not hesitate to call me. I look forward to following Ravi along with you.         Sincerely,        Kb Escobar MD        CC: No Recipients      Reason for Visit: cardiovascular follow up for atrial fibrillation, systolic CHF, mitral valve replacement, and management of chronic anticoagulation.    HPI:  Ravi Church is a 80 y.o. male is here today for 3-month follow-up.  He was seen as a self-referral for atrial fibrillation on 2/27/2025.  Significant cardiac history includes persistent atrial fibrillation, chronic systolic CHF, mechanical arctic valve replacement, and chronic anticoagulation.  He is planning on traveling more in the near future.  He wants to take the Lasix on as needed basis.  He has an upset stomach and wonders if the spironolactone could be contributing.  He tries to eat healthy and get a lot of protein.  He feels tired and fatigued at times.      Previous Cardiac Testing and Procedures:  -Mechanical MV replacement (9/25/2001) St Nikko 31 mm at [facility] in [place], [place]  -Echo (12/30/2024) EF 35-40%, mechanical mitral valve with mean gradient 4 mmHg, normal RV size and function, moderate to severe biatrial enlargement, RVSP 51 mmHg  -Holter monitor (1/16/2025) primary rhythm was atrial fibrillation with average heart rate 68 bpm, frequent PVCs with a burden of 5.5%, 6 runs of nonsustained VT up to 120 beats     Lab data:  -Lipid panel (5/9/2024) total cholesterol 97, HDL 36, LDL 47, triglycerides 72  -BMP (5/15/2024) creatinine 0.91, potassium 4.2, sodium 138  -BMP (4/16/2025) creatinine 1.1, GFR > 60, potassium 4.4, sodium 142  -Lipid panel (5/21/2025) total cholesterol 86, HDL 37, LDL 35,  triglycerides 69    Patient Active Problem List   Diagnosis   • Atrial fibrillation, persistent   • Chronic systolic (congestive) heart failure   • H/O mitral valve replacement with mechanical valve   • Chronic anticoagulation   • GERD (gastroesophageal reflux disease)       Social History     Tobacco Use   • Smoking status: Never   • Smokeless tobacco: Never   Vaping Use   • Vaping status: Never Used   Substance Use Topics   • Alcohol use: Never   • Drug use: Never       Family History   Problem Relation Age of Onset   • Heart failure Mother        The following portions of the patient's history were reviewed and updated as appropriate: allergies, current medications, past family history, past medical history, past social history, past surgical history, and problem list.      Current Outpatient Medications:   •  acetaminophen (TYLENOL) 650 MG 8 hr tablet, Every 12 (Twelve) Hours., Disp: , Rfl:   •  aspirin 81 MG EC tablet, aspirin 81 mg tablet,delayed release  Take 1 tablet every day by oral route., Disp: , Rfl:   •  atorvastatin (LIPITOR) 10 MG tablet, TAKE 1 TABLET BY MOUTH EVERY DAY IN THE EVENING FOR 90 DAYS, Disp: , Rfl:   •  furosemide (LASIX) 40 MG tablet, Take 1 tablet by mouth Daily., Disp: 30 tablet, Rfl: 11  •  Magnesium Chloride (MAG64 PO), Mag 64, Disp: , Rfl:   •  magnesium oxide (MAG-OX) 400 MG tablet, magnesium oxide 400 mg (241.3 mg magnesium) tablet  Take 1 tablet every day by oral route in the morning., Disp: , Rfl:   •  omeprazole (priLOSEC) 40 MG capsule, Take 1 capsule by mouth 2 (Two) Times a Day Before Meals., Disp: , Rfl:   •  sacubitril-valsartan (Entresto) 24-26 MG tablet, Take 1 tablet by mouth 2 (Two) Times a Day., Disp: 60 tablet, Rfl: 11  •  spironolactone (ALDACTONE) 25 MG tablet, Take 1 tablet by mouth Daily., Disp: 90 tablet, Rfl: 3  •  warfarin (COUMADIN) 1 MG tablet, warfarin 1 mg tablet  TAKE 1 TABLET(1 MG) BY MOUTH EVERY DAY, Disp: , Rfl:   •  warfarin (COUMADIN) 2.5 MG tablet,  "warfarin 2.5 mg tablet, Disp: , Rfl:     Review of Systems   Constitutional: Positive for malaise/fatigue. Negative for chills and fever.   Cardiovascular:  Negative for chest pain and paroxysmal nocturnal dyspnea.   Respiratory:  Negative for cough and shortness of breath.    Skin:  Negative for rash.   Gastrointestinal:  Positive for nausea. Negative for abdominal pain and heartburn.   Neurological:  Negative for dizziness and numbness.       Objective  /64 (BP Location: Left arm, Patient Position: Sitting, Cuff Size: Adult)   Pulse 68   Ht 180.3 cm (70.98\")   Wt 89.8 kg (198 lb)   SpO2 93%   BMI 27.63 kg/m²   Constitutional:       Appearance: Well-developed.   HENT:      Head: Normocephalic and atraumatic.   Pulmonary:      Effort: Pulmonary effort is normal.      Breath sounds: Normal breath sounds.   Cardiovascular:      Normal rate. Irregular rhythm.      Comments: Mechanical MV click  Edema:     Peripheral edema present.     Pretibial: bilateral trace edema of the pretibial area.     Ankle: bilateral trace edema of the ankle.  Skin:     General: Skin is warm and dry.   Neurological:      Mental Status: Alert and oriented to person, place, and time.       Procedures      ICD-10-CM ICD-9-CM   1. Atrial fibrillation, persistent  I48.19 427.31   2. Chronic systolic (congestive) heart failure  I50.22 428.22     428.0   3. H/O mitral valve replacement with mechanical valve  Z95.2 V43.3   4. Chronic anticoagulation  Z79.01 V58.61         Assessment/Plan:  1.  Persistent atrial fibrillation: Rate controlled atrial fibrillation on EKG from last visit in February.  Heart rate remains normal today and has not required any AV woodrow blocking drugs.  No significant palpitations.  Continue warfarin.       2.  Chronic systolic CHF: EF 35-40% on echo on 12/30/2024.  Remains euvolemic on exam with NYHA class I functional capacity.  Previously declined SGLT2 inhibitor due to infection risk.  Blood pressure is on the " low side and there is no room to add a beta-blocker a beta-blocker may also make him bradycardic.  Continue Entresto and spironolactone.  We discussed taking furosemide on a as needed basis.      3.  Mechanical mitral valve replacement: 31 mm Saint Nikko valve on 9/25/2001.  Normal function with mean gradient 4 mmHg on echo on 12/30/2024.  Continue warfarin.     4.  Chronic anticoagulation: Continue warfarin.